# Patient Record
Sex: MALE | Race: WHITE | NOT HISPANIC OR LATINO | URBAN - METROPOLITAN AREA
[De-identification: names, ages, dates, MRNs, and addresses within clinical notes are randomized per-mention and may not be internally consistent; named-entity substitution may affect disease eponyms.]

---

## 2018-03-28 ENCOUNTER — TRANSFERRED RECORDS (OUTPATIENT)
Dept: HEALTH INFORMATION MANAGEMENT | Facility: CLINIC | Age: 83
End: 2018-03-28

## 2018-04-12 ENCOUNTER — TRANSFERRED RECORDS (OUTPATIENT)
Dept: HEALTH INFORMATION MANAGEMENT | Facility: CLINIC | Age: 83
End: 2018-04-12

## 2018-04-17 ENCOUNTER — TRANSFERRED RECORDS (OUTPATIENT)
Dept: HEALTH INFORMATION MANAGEMENT | Facility: CLINIC | Age: 83
End: 2018-04-17

## 2019-03-05 ENCOUNTER — TRANSFERRED RECORDS (OUTPATIENT)
Dept: HEALTH INFORMATION MANAGEMENT | Facility: CLINIC | Age: 84
End: 2019-03-05

## 2023-01-01 ENCOUNTER — TRANSCRIBE ORDERS (OUTPATIENT)
Dept: RADIATION ONCOLOGY | Facility: CLINIC | Age: 88
End: 2023-01-01

## 2023-01-01 ENCOUNTER — PATIENT OUTREACH (OUTPATIENT)
Dept: ONCOLOGY | Facility: CLINIC | Age: 88
End: 2023-01-01
Payer: MEDICARE

## 2023-01-01 ENCOUNTER — PRE VISIT (OUTPATIENT)
Dept: RADIATION ONCOLOGY | Facility: CLINIC | Age: 88
End: 2023-01-01
Payer: MEDICARE

## 2023-01-01 ENCOUNTER — TRANSCRIBE ORDERS (OUTPATIENT)
Dept: ONCOLOGY | Facility: CLINIC | Age: 88
End: 2023-01-01

## 2023-01-01 DIAGNOSIS — C61 PROSTATE CANCER (H): Primary | ICD-10-CM

## 2023-12-12 NOTE — PROGRESS NOTES
New Patient Oncology Nurse Navigator Note     Referring provider:   Epifanio Hassan MD of [facility] - Rad Onc     Referred to (specialty): Medical Oncology    Date Referral Received:   12/11/23     Evaluation for :   Discussion of Pluvicto  Adenocarcinoma of the prostate, Wittenberg's score of 4+ 5=9 and pretreatment PSA of 120.22  STAGE: IV     Clinical History (per Nurse review of records provided):    HISTORY per rad onc note   Mr. Vizcaino is an 88-year-old male who presented with an elevated PSA:  (1/29/2018) PSA 47.66.  (3/28/2018) .22.  (3/28/2018): Transrectal ultrasound guided biopsy of the prostate revealed adenocarcinoma of the prostate involving 6/6 cores with Wittenberg 4+5 = 9 and 4+4 = 8 in 15->95% of the cores (Sample ID: [id number]).  (4/12/2018): CT of the abdomen and pelvis showed an enlarged prostate with soft tissue thickening and stranding extending to the base of the bladder as well as retroperitoneal and iliac chain lymphadenopathy consistent with metastatic disease. Bone scan was negative for bone metastases.  (4/2018): Started ADT with lupron and continues to get ADT injections every 6 months.  (5/15/2018): PSA 64.25  (11/13/2018): PSA 10.46  (3/5/2019): FDG PET/CT showed indeterminate retroperitoneal adenopathy slightly increased in size from 4/12/2018 but without abnormal FDG activity.  (10/2022): Started Xtandi. Has had to decrease to 2 tablets twice weekly due to nausea but now back up to 4 capsules once daily. :  More recently, the patient has had increasing difficulty with urinary retention requiring catheterization, He underwent TURP and KTP laser (10/20/2023)  (10/27/2023): He received his most recent 6 month Eligard injection.      Clinical Assessment / Barriers to Care (Per Nurse): [place] speaking       Records Location (Care Everywhere, Media, etc.):   [facility]  ? Other oncology/urology      I called with a [place] ,  in attempt to reach patient to discuss  referral to medical oncology.  There was no answer at either phone number so I LM for him to return my call.    I contacted Dr. Hassan's office to discuss referral.  It does not appear patient has even seen oncology and has not received chemotherapy.  He would not likely qualify for Pluvicto, but we can get him in for oncology opinion.    Tentative plan:  SCHEDULE: First available med onc for prostate cancer @ pt preferred location, NEW, in person, needs [place]  (I am not certain his language is listed on chart)

## 2023-12-12 NOTE — TELEPHONE ENCOUNTER
Action 2024 1:08 PM ABT   Action Taken Records from [facility]  received and sent to HIM for upload     Action 2024 8:18 AM ABT   Action Taken SUZAN received back and sent to HIM for upload. SUZAN and records request sent to [facility].     Action 2024 4:03 PM ABT   Action Taken Pt's son Alvin called back. SUZAN for [facility] records emailed to [email or web address]      Action 2023 2:44 PM ABT   Action Taken [facility] needs SUZAN for release of records.    Called and unable to speak to Patient or son Alvin. M for call back regarding SUZAN for [facility] records.     MEDICAL RECORDS REQUEST   Radiation Oncology  [address] [place] [address]  Fax: [phone]          FUTURE VISIT INFORMATION                                                   Jose Vizcaino, : 1935 scheduled for future visit at [facility] Radiation Oncology    RECORDS REQUESTED FOR VISIT                                                     PROSTATE     OFFICE NOTE from medical oncologist  Consult:    F/U:   OFFICE NOTE from urologist Req -[facility] Consult:    F/U:   COLONOSCOPY     OPERATIVE/BIOPSY REPORTS (include if prostate was removed) Req -[facility] 19: Cystoscopy  18: Prostate biopsy   PSA LABS (within 5 years) [facility] Most recent 10/24/23   MEDICATION LIST     LABS     PATHOLOGY REPORTS Req -[facility]    ANYTHING RELATED TO DIAGNOSIS [facility] Most recent 10/31/23   IMAGING (NEED IMAGES & REPORT)     CT SCANS [facility]:  10/15/23: CT Abd Pel   XRAYS Re[facility]:  23, 23: XR Abd   ULTRASOUND Re[facility]:  19: US Renal & Bladder   PET Req -[facility]:  19: PET CT Skull   PREVIOUS RADIATION  (Consult only)   WHAT [facility]ALTHCARE FACILITY [facility]   RADIATION ONCOLOGIST NOTES [facility] 23: Dr. Epifanio Hassan

## 2024-01-01 ENCOUNTER — LAB REQUISITION (OUTPATIENT)
Dept: LAB | Facility: CLINIC | Age: 89
End: 2024-01-01
Payer: MEDICARE

## 2024-01-01 ENCOUNTER — PRE VISIT (OUTPATIENT)
Dept: ONCOLOGY | Facility: CLINIC | Age: 89
End: 2024-01-01
Payer: MEDICARE

## 2024-01-01 ENCOUNTER — TRANSFERRED RECORDS (OUTPATIENT)
Dept: HEALTH INFORMATION MANAGEMENT | Facility: CLINIC | Age: 89
End: 2024-01-01
Payer: MEDICARE

## 2024-01-01 ENCOUNTER — HEALTH MAINTENANCE LETTER (OUTPATIENT)
Age: 89
End: 2024-01-01

## 2024-01-01 ENCOUNTER — TRANSFERRED RECORDS (OUTPATIENT)
Dept: HEALTH INFORMATION MANAGEMENT | Facility: CLINIC | Age: 89
End: 2024-01-01

## 2024-01-01 LAB
PATH REPORT.COMMENTS IMP SPEC: ABNORMAL
PATH REPORT.COMMENTS IMP SPEC: YES
PATH REPORT.FINAL DX SPEC: ABNORMAL
PATH REPORT.GROSS SPEC: ABNORMAL
PATH REPORT.MICROSCOPIC SPEC OTHER STN: ABNORMAL
PATH REPORT.RELEVANT HX SPEC: ABNORMAL
PATH REPORT.RELEVANT HX SPEC: ABNORMAL
PATH REPORT.SITE OF ORIGIN SPEC: ABNORMAL

## 2024-01-01 PROCEDURE — 88321 CONSLTJ&REPRT SLD PREP ELSWR: CPT | Mod: GC | Performed by: PATHOLOGY

## 2024-02-28 NOTE — TELEPHONE ENCOUNTER
Action 2024 12:25 PM ABT   Action Taken Slides from [facility] received and taken to 5th floor path lab for review.    [id number]: 24     Imaging Received  2024 12:18 PM ABT   Action: Images from [facility] received and resolved to PACS.     RECORDS STATUS - ALL OTHER DIAGNOSIS      RECORDS RECEIVED FROM: [facility]   DATE RECEIVED:    NOTES STATUS DETAILS   OFFICE NOTE from referring provider [facility] 23: Dr. Epifanio Hassan   OFFICE NOTE from other specialist External: [facility] 24: Dr. Will Anderson   DISCHARGE SUMMARY from hospital [facility] 24, 24, 23, 10/22/23, 23: [facility]   DISCHARGE REPORT from the ER [facility] 24: [facility] ED   OPERATIVE REPORT [facility]/External: [facility] 24: CYSTOSCOPY TRANSURETHERAL RESECTION PROSTATE     10/20/23: CYSTOSCOPY TRANSURETHERAL RESECTION OF PROSTATE WITH KTP LASER     19: Cystoscopy  18: Prostate biopsy   MEDICATION LIST [facility]    LABS     PATHOLOGY REPORTS Req -[facility]  [facility]  [facility] [facility]  Req again - [facility]:  [id number] [facility]:  24: [id number]    [facility]:  18: [id number]   ANYTHING RELATED TO DIAGNOSIS [facility] Most recent 24   IMAGING (NEED IMAGES & REPORT)     CT SCANS PACS/Req -[facility]:  24, 24: CT CAP  06/10/24: CT Pel  24, 24, 10/15/23: CT Abd Pel  24: CT CHest    [facility]:  18: CT Abd Pel   MRI PACS [facility]:  24, 23: MR Head   XRAYS PACS/Req [facility]:  24, 24, 24, 23, 23: XR Abd    [facility]:  18: [place] Bone Scan   ULTRASOUND PACS [facility]:  24: US Abd  19: US Renal & Bladder   PET PACS [facility]:  19: PET CT Skull